# Patient Record
Sex: MALE | Race: WHITE | Employment: OTHER | URBAN - METROPOLITAN AREA
[De-identification: names, ages, dates, MRNs, and addresses within clinical notes are randomized per-mention and may not be internally consistent; named-entity substitution may affect disease eponyms.]

---

## 2022-02-14 ENCOUNTER — HOSPITAL ENCOUNTER (OUTPATIENT)
Dept: GENERAL RADIOLOGY | Age: 77
Discharge: HOME | End: 2022-02-14
Payer: MEDICARE

## 2022-02-14 DIAGNOSIS — M48.061 LUMBAR SPINAL STENOSIS: ICD-10-CM

## 2022-02-14 PROCEDURE — 72110 X-RAY EXAM L-2 SPINE 4/>VWS: CPT

## 2022-02-17 ENCOUNTER — TRANSCRIBE ORDER (OUTPATIENT)
Dept: SCHEDULING | Age: 77
End: 2022-02-17

## 2022-02-17 DIAGNOSIS — M48.061 LUMBAR SPINAL STENOSIS: Primary | ICD-10-CM

## 2022-03-01 ENCOUNTER — HOSPITAL ENCOUNTER (OUTPATIENT)
Age: 77
Discharge: HOME | End: 2022-03-01
Attending: PHYSICIAN ASSISTANT
Payer: MEDICARE

## 2022-03-01 DIAGNOSIS — M48.061 LUMBAR SPINAL STENOSIS: ICD-10-CM

## 2022-03-01 PROCEDURE — 72148 MRI LUMBAR SPINE W/O DYE: CPT

## 2022-03-09 ENCOUNTER — TRANSCRIBE ORDER (OUTPATIENT)
Dept: SCHEDULING | Age: 77
End: 2022-03-09

## 2022-03-09 DIAGNOSIS — M48.02 CERVICAL SPINAL STENOSIS: Primary | ICD-10-CM

## 2022-04-18 ENCOUNTER — HOSPITAL ENCOUNTER (OUTPATIENT)
Age: 77
Discharge: HOME | End: 2022-04-18
Attending: ORTHOPAEDIC SURGERY
Payer: COMMERCIAL

## 2022-04-18 DIAGNOSIS — M48.02 CERVICAL SPINAL STENOSIS: ICD-10-CM

## 2022-04-18 PROCEDURE — 72141 MRI NECK SPINE W/O DYE: CPT

## 2022-07-11 NOTE — PROGRESS NOTES
[facility]  [address], [address]  Phone: [phone]  Fax: [phone]        INITIAL CONSULTATION      HISTORY OF PRESENT ILLNESS:  Owen Troncoso is a 76 y.o. male whom is referred from Dr. Elba Velazquez for lumbar block evaluation. He presents today with c/o progressive low back pain radiating into the BLE (R>L) RLE radiates in a S1 distribution to the foot and LLE pain is intermittent in a similar distribution. x 60 years without injury. He reports pain has been severe over the past 6 months. He additionally c/o neck pain w/ numbness on the left side of his neck w/ right shoulder x 1-2 years w/o injury. He notes intermittent paresthesias in his left hand involving digits 1-3, admits to dropping things from that hand at times. He rates his pain 10/10. His pain is aggravated with standing and walking, alleviated with sitting. Pt is taking Neurontin 600 mg TID x 1 month and is tolerating it well with some relief. Pt reports he additionally has been treating with OTC extra strength Tylenol with some relief. Pt admits to prior spinal injections with relief 10-20 years ago. Pt admits to prior chiropractic treatment for his neck and back with benefit. Pt reports he had been in pain management years ago for 5 months. Patient denies previous spinal surgery or physical therapy. Patient denies previous cervical surgery, injections, or physical therapy. Pt denies change in bowel or bladder habits. Pt denies fever, weight loss, or skin changes. Pt is taking Plavix and Aspirin through Ramila Aguilar MD. Recently had a echocardiogram done on 5/3/2022 through Ava Smith MD which was in the normal range. Pt is a former smoker. PmHx of CAD, DM, HTN, arthritis, obesity.  The pt admits to hx of DM but does not know how his sugars run, has been advised to monitor his blood sugars by Ramila Aguilar MD. Note from Dr. Elba Velazquez dated 6/7/202 indicating patient was seen with c/o low back pain radiating into the RLE. Pain 9/10. Pt does not wish to have surgery. Recommended right sided L3 and L4 SNRBs to improve functionality. Lumbar spine MRI dated 3/1/2022 films independently reviewed. Per report, multilevel stenosis central canal stenosis moderate to severe L2-3 through L4-5. Degenerative changes L1-2 L5-S1. Herniation encroachment into the neuroforamina multiple levels. Cervical spine MRI dated 4/18/2022 films not independently reviewed. Per report, at C3-C4, disproportionate severe central canal and foraminal narrowing. Suspect slight cord edema and/or myelomalacia at the C3-C4 level. Advanced multilevel facet arthrosis most notably at C3-C4 with reactive marrow edema. Pt denies prior EMG studies. The patient is RHD.  reviewed. Body mass index is 34.22 kg/m². PCP: Demar Escalera MD    Past Medical History:   Diagnosis Date    Arthritis     Diabetes ([address]     GERD (gastroesophageal reflux disease)     Heart disease     heart attack 1997    Hypertension     Spinal stenosis    Subs  Past Surgical History:   Procedure Laterality Date    HX ANGIOPLASTY      HX CATARACT REMOVAL      both eyes   tance Use Topics    Alcohol use: Yes     Comment: daily light beer       Work status: The patient is retired. Marital status: N/A          Allergies   Allergen Reactions    Contrast Dye [Iodine] Other (comments)     Cardiac arrest          History reviewed. No pertinent family history. REVIEW OF SYSTEMS  Constitutional symptoms: Negative  Eyes: Negative  Ears, Nose, Throat, and Mouth: Negative  Cardiovascular: Negative  Respiratory: Negative  Genitourinary: Negative  Integumentary (Skin and/or breast): Negative  Musculoskeletal: Positive for low back pain radiating into the BLE  Extremities: Negative for edema.   Endocrine/Rheumatologic: Negative  Hematologic/Lymphatic: Negative  Allergic/Immunologic: Negative  Psychiatric: Negative       PHYSICAL EXAMINATION  Visit Vitals  Pulse (!) 127   Temp 97.2 °F (36.2 °C) (Skin)   Ht 5' 6\" (1.676 m)   Wt 212 lb (96.2 kg)   SpO2 98%   BMI 34.22 kg/m²       CONSTITUTIONAL: NAD, A&O x 3  HEART:  Tachycardia irregular rhythm  GASTROINTESTINAL: Positive bowel sounds, soft, nontender, and nondistended  LUNGS: Clear to auscultation bilaterally. SKIN: Negative for rash. RANGE OF MOTION: The patient has full passive range of motion in all four extremities. SENSATION: .sensation is intact to light touch throughout. MOTOR:   Straight Leg Raise: Negative, bilateral  Parker: Negative, bilateral  Tandem Gait: Neg. Deep tendon reflexes are 0 at the biceps, 0 at the brachioradialis and 1 at the triceps, bilaterally. Deep tendon reflexes are 0 at the knees and 0 at the ankles bilaterally. Ambulates with a single point cane     Shoulder AB/Flex Elbow Flex Wrist Ext Elbow Ext Wrist Flex Hand Intrin Tone   Right +4/5 +4/5 +4/5 +4/5 +4/5 +4/5 +4/5   Left +4/5 +4/5 +4/5 +4/5 +4/5 +4/5 +4/5              Hip Flex Knee Ext Knee Flex Ankle DF GTE Ankle PF Tone   Right +4/5 +4/5 +4/5 +4/5 +4/5 +4/5 +4/5   Left +4/5 +4/5 +4/5 +4/5 +4/5 +4/5 +4/5         ASSESSMENT   Diagnoses and all orders for this visit:    1. Lumbar pain    2. Lumbar neuritis    3. DDD (degenerative disc disease), lumbar    4. HNP (herniated nucleus pulposus), lumbar    5. Spinal stenosis of lumbar region without neurogenic claudication    6. Cervical spondylosis without myelopathy       IMPRESSIONS/RECOMMENDATIONS:  Patient presents today with c/o progressive low back pain radiating into the BLE (R>L) RLE radiates in a S1 distribution to the foot and LLE pain is intermittent in a similar distribution. x 60 years without injury. Pain has become severe over the past 6 months. He additionally c/o neck pain w/ numbness on the left side of his neck w/ right shoulder x 1-2 years w/o injury. He notes intermittent paresthesias in his left hand involving digits 1-3.  Multiple treatment options were discussed. I will move forward with ordering a right sided L3 and L4 SNRBs as recommended by Dr. Maximino Franz, pending approval from Edilson Hernandez MD secondary to DM and taking Aspirin and Plavix. Patient is neurologically intact. I will see the patient back prn. The pt should f/u with Dr. Maximino Franz following block. Written by Vi Quinones, as dictated by Clem Lora MD  I examined the patient, reviewed and agree with the note.

## 2022-07-13 ENCOUNTER — OFFICE VISIT (OUTPATIENT)
Dept: ORTHOPEDIC SURGERY | Age: 77
End: 2022-07-13
Payer: COMMERCIAL

## 2022-07-13 VITALS
WEIGHT: 212 LBS | TEMPERATURE: 97.2 F | HEART RATE: 127 BPM | OXYGEN SATURATION: 98 % | BODY MASS INDEX: 34.07 KG/M2 | HEIGHT: 66 IN

## 2022-07-13 DIAGNOSIS — M54.50 LUMBAR PAIN: Primary | ICD-10-CM

## 2022-07-13 DIAGNOSIS — M54.16 LUMBAR NEURITIS: ICD-10-CM

## 2022-07-13 DIAGNOSIS — M51.36 DDD (DEGENERATIVE DISC DISEASE), LUMBAR: ICD-10-CM

## 2022-07-13 DIAGNOSIS — M51.26 HNP (HERNIATED NUCLEUS PULPOSUS), LUMBAR: ICD-10-CM

## 2022-07-13 DIAGNOSIS — M48.061 SPINAL STENOSIS OF LUMBAR REGION WITHOUT NEUROGENIC CLAUDICATION: ICD-10-CM

## 2022-07-13 DIAGNOSIS — M47.812 CERVICAL SPONDYLOSIS WITHOUT MYELOPATHY: ICD-10-CM

## 2022-07-13 PROCEDURE — 1123F ACP DISCUSS/DSCN MKR DOCD: CPT | Performed by: PHYSICAL MEDICINE & REHABILITATION

## 2022-07-13 PROCEDURE — 99204 OFFICE O/P NEW MOD 45 MIN: CPT | Performed by: PHYSICAL MEDICINE & REHABILITATION

## 2022-07-13 RX ORDER — ATORVASTATIN CALCIUM 80 MG/1
TABLET, FILM COATED ORAL
COMMUNITY
Start: 2022-02-10

## 2022-07-13 RX ORDER — FAMOTIDINE 40 MG/1
40 TABLET, FILM COATED ORAL DAILY
COMMUNITY
Start: 2022-02-10

## 2022-07-13 RX ORDER — ISOSORBIDE MONONITRATE 30 MG/1
30 TABLET, EXTENDED RELEASE ORAL EVERY MORNING
COMMUNITY
Start: 2022-02-10

## 2022-07-13 RX ORDER — LISINOPRIL 20 MG/1
20 TABLET ORAL DAILY
COMMUNITY
Start: 2022-03-25 | End: 2022-08-17 | Stop reason: ALTCHOICE

## 2022-07-13 RX ORDER — GABAPENTIN 600 MG/1
600 TABLET ORAL 3 TIMES DAILY
COMMUNITY
Start: 2022-03-08

## 2022-07-13 RX ORDER — DEXTROMETHORPHAN HYDROBROMIDE, GUAIFENESIN 5; 100 MG/5ML; MG/5ML
1300 LIQUID ORAL
COMMUNITY

## 2022-07-13 RX ORDER — LANOLIN ALCOHOL/MO/W.PET/CERES
500 CREAM (GRAM) TOPICAL DAILY
COMMUNITY

## 2022-07-13 RX ORDER — METFORMIN HYDROCHLORIDE 500 MG/1
500 TABLET, EXTENDED RELEASE ORAL DAILY
COMMUNITY
Start: 2022-02-10

## 2022-07-13 RX ORDER — METOPROLOL SUCCINATE 25 MG/1
25 TABLET, EXTENDED RELEASE ORAL DAILY
COMMUNITY
Start: 2022-02-10

## 2022-07-13 RX ORDER — CHLORPHENIRAMINE MALEATE 4 MG
TABLET ORAL
COMMUNITY
Start: 2022-05-17

## 2022-07-13 RX ORDER — NITROGLYCERIN 0.4 MG/1
TABLET SUBLINGUAL
COMMUNITY
Start: 2022-02-28

## 2022-07-13 RX ORDER — CLOPIDOGREL BISULFATE 75 MG/1
75 TABLET ORAL DAILY
COMMUNITY
Start: 2022-02-10 | End: 2022-08-17 | Stop reason: ALTCHOICE

## 2022-07-13 NOTE — LETTER
7/13/2022    Patient: Tutu Aguayo   YOB: 1945   Date of Visit: 7/13/2022     Marco A Montes MD  [address] [address]  Via Fax: [address]  [address]  [address] [address] [address]  Via In [facility] [address]    Dear MD Gm Gipson MD,      Thank you for referring Mr. Tutu Aguayo to [address] for evaluation. My notes for this consultation are attached. If you have questions, please do not hesitate to call me. I look forward to following your patient along with you.       Sincerely,    Micaela Tabor MD

## 2022-07-29 ENCOUNTER — TELEPHONE (OUTPATIENT)
Dept: ORTHOPEDIC SURGERY | Age: 77
End: 2022-07-29

## 2022-08-11 ENCOUNTER — DOCUMENTATION ONLY (OUTPATIENT)
Dept: ORTHOPEDIC SURGERY | Age: 77
End: 2022-08-11

## 2022-08-17 ENCOUNTER — TELEPHONE (OUTPATIENT)
Dept: ORTHOPEDIC SURGERY | Age: 77
End: 2022-08-17

## 2022-08-26 ENCOUNTER — VIRTUAL VISIT (OUTPATIENT)
Dept: ORTHOPEDIC SURGERY | Age: 77
End: 2022-08-26
Payer: COMMERCIAL

## 2022-08-26 DIAGNOSIS — M51.26 HNP (HERNIATED NUCLEUS PULPOSUS), LUMBAR: ICD-10-CM

## 2022-08-26 DIAGNOSIS — M54.16 LUMBAR NEURITIS: ICD-10-CM

## 2022-08-26 DIAGNOSIS — M54.50 LUMBAR PAIN: Primary | ICD-10-CM

## 2022-08-26 DIAGNOSIS — M51.36 DDD (DEGENERATIVE DISC DISEASE), LUMBAR: ICD-10-CM

## 2022-08-26 DIAGNOSIS — M48.061 SPINAL STENOSIS OF LUMBAR REGION WITHOUT NEUROGENIC CLAUDICATION: ICD-10-CM

## 2022-08-26 PROCEDURE — 1123F ACP DISCUSS/DSCN MKR DOCD: CPT | Performed by: PHYSICAL MEDICINE & REHABILITATION

## 2022-08-26 PROCEDURE — 99441 PR PHYS/QHP TELEPHONE EVALUATION 5-10 MIN: CPT | Performed by: PHYSICAL MEDICINE & REHABILITATION

## 2022-08-26 RX ORDER — LORAZEPAM 1 MG/1
TABLET ORAL
COMMUNITY
Start: 2022-07-19

## 2022-08-26 RX ORDER — ASPIRIN 81 MG/1
81 TABLET ORAL DAILY
COMMUNITY
Start: 2022-07-23

## 2022-08-26 NOTE — PROGRESS NOTES
[facility]  [address], [address]   Phone: [phone]  Fax: [phone]        PROGRESS NOTE    CONSENT:  Pursuant to the emergency declaration under the [address] and [facility] waiver authority and the [employer] Act, this Virtual Visit was conducted, with patient's consent, to reduce the patient's risk of exposure to COVID-19 and provide continuity of care for an established patient. Services were unable to be provided through a video synchronous discussion virtually to substitute for in-person appointment. Subsequently, the patient was consulted through a telephone discussion. ENCOUNTER DURATION: 7 minutes 52 seconds    Ms. Phi Groves is being consulted at home by me via telephone at the [facility] office    HISTORY OF PRESENT ILLNESS:  The patient is a 76 y.o. male. Mr. Phi Groves is being consulted by me via telephone at the [facility] office for follow up of lumbar block evaluation. He presents today with c/o progressive low back pain radiating into the BLE (R>L) RLE radiates in a S1 distribution to the foot and LLE pain is intermittent in a similar distribution. x 60 years without injury. He reports pain has been severe over the past 6 months. He additionally c/o neck pain w/ numbness on the left side of his neck w/ right shoulder x 1-2 years w/o injury. He notes intermittent paresthesias in his left hand involving digits 1-3, admits to dropping things from that hand at times. His pain is aggravated with standing and walking, alleviated with sitting. Pt is taking Neurontin 600 mg TID x 1 month and is tolerating it well with some relief. Pt reports he additionally has been treating with OTC extra strength Tylenol with some relief. Pt admits to prior spinal injections with relief 10-20 years ago. Pt admits to prior chiropractic treatment for his neck and back with benefit. Pt reports he had been in pain management years ago for 5 months. Patient denies previous spinal surgery or physical therapy. Patient denies previous cervical surgery, injections, or physical therapy. Pt denies change in bowel or bladder habits. Pt denies fever, weight loss, or skin changes. Pt is taking Plavix and Aspirin through Deanna Montejo MD. Recently had a echocardiogram done on 5/3/2022 through Johnson Robles MD which was in the normal range. Pt is a former smoker. The patient is RHD. PmHx of CAD, DM, HTN, arthritis, obesity. The pt admits to hx of DM but does not know how his sugars run, has been advised to monitor his blood sugars by Deanna Montejo MD. Note from Dr. Rachel Martinez dated 6/7/202 indicating patient was seen with c/o low back pain radiating into the RLE. Pain 9/10. Pt does not wish to have surgery. Recommended right sided L3 and L4 SNRBs to improve functionality. Lumbar spine MRI dated 3/1/2022 films independently reviewed. Per report, multilevel stenosis central canal stenosis moderate to severe L2-3 through L4-5. Degenerative changes L1-2 L5-S1. Herniation encroachment into the neuroforamina multiple levels. Cervical spine MRI dated 4/18/2022 films not independently reviewed. Per report, at C3-C4, disproportionate severe central canal and foraminal narrowing. Suspect slight cord edema and/or myelomalacia at the C3-C4 level. Advanced multilevel facet arthrosis most notably at C3-C4 with reactive marrow edema. Pt denies prior EMG studies. At his last clinical appointment, his pain became severe over the past 6 months. He additionally c/o neck pain w/ numbness on the left side of his neck w/ right shoulder x 1-2 years w/o injury. He noted intermittent paresthesias in his left hand involving digits 1-3. Multiple treatment options were discussed.  I moved forward with ordering a right sided L3 and L4 SNRBs as recommended by Dr. Rachel Martinez, pending approval from Deanna Montejo MD secondary to DM and taking Aspirin and Plavix. Pt presents for an updated progress note prior to proceeding with scheduled block for 8/20/2022. Pt had been hospitalized for 4 days secondary to Afib on 7/10/2022. Discontinued Aspirin and Plavix by Cardiologist. Is taking Eliquis. At today's telephone consultation, the patient reports pain location and distribution remains unchanged. He rates his pain 9/10, previously 10/10. Denies any changes. Pt denies any signs of weakness. Pt denies change in bowel or bladder habits. The patient has a history of DM and reports blood sugars are well controlled, consistently remaining below 200.  reviewed. There is no height or weight on file to calculate BMI.     PCP: Brandi Mcghee MD      Past Medical History:   Diagnosis Date    Arthritis     Diabetes ([facility] [address]     GERD (gastroesophageal reflux disease)     Heart disease     heart attack 1997    Hypertension     Spinal stenosis         Social History     Socioeconomic History    Marital status: SINGLE     Spouse name: Not on file    Number of children: Not on file    Years of education: Not on file    Highest education level: Not on file   Occupational History    Not on file   Tobacco Use    Smoking status: Former    Smokeless tobacco: Never   Substance and Sexual Activity    Alcohol use: Yes     Comment: daily light beer    Drug use: Never    Sexual activity: Not on file   Other Topics Concern    Not on file   Social History Narrative    Not on file     Social Determinants of Health     Financial Resource Strain: Not on file   Food Insecurity: Not on file   Transportation Needs: Not on file   Physical Activity: Not on file   Stress: Not on file   Social Connections: Not on file   Intimate Partner Violence: Not on file   Housing Stability: Not on file       Current Outpatient Medications   Medication Sig Dispense Refill    LORazepam (ATIVAN) 1 mg tablet TAKE 1 TABLET BY MOUTH 1 HOUR BEFORE MRI MAY REPEAT IN ONE HOUR IF NEEDED apixaban (ELIQUIS) 5 mg tablet Take 5 mg by mouth two (2) times a day. dronedarone (MULTAQ) tab tablet Take 400 mg by mouth two (2) times daily (with meals). atorvastatin (LIPITOR) 80 mg tablet TAKE 1 TABLET BY MOUTH EVERY DAY FOR CHOLESTEROL      clotrimazole (LOTRIMIN) 1 % topical cream APPLY TO THE AFFECTED AREA EXTERNALLY TWICE DAILY FOR UP TO 14 DAYS - REPEAT FOR FLARES      cyanocobalamin (VITAMIN B12) 500 mcg tablet Take 500 mcg by mouth daily. famotidine (PEPCID) 40 mg tablet Take 40 mg by mouth daily. gabapentin (NEURONTIN) 600 mg tablet Take 600 mg by mouth three (3) times daily. isosorbide mononitrate ER (IMDUR) 30 mg tablet Take 30 mg by mouth Every morning. metFORMIN ER (GLUCOPHAGE XR) 500 mg tablet Take 500 mg by mouth daily. metoprolol succinate (TOPROL-XL) 25 mg XL tablet Take 25 mg by mouth daily. nitroglycerin (NITROSTAT) 0.4 mg SL tablet TAKE 1 TABLET SUBLINGUAL EVERY 5 MINUTES AS NEEDED FOR CHEST PAIN- IF 3 OR MORE NEEDED PROCEED TO ER      acetaminophen (TYLENOL) 650 mg TbER Take 1,300 mg by mouth two (2) times daily as needed for Pain. aspirin delayed-release 81 mg tablet Take 81 mg by mouth daily. (Patient not taking: Reported on 8/26/2022)      diazePAM (VALIUM) 10 mg tablet TAKE 1 TAB BY MOUTH AS DIRECTED BY NURSE PRIOR TO PROCEDURE (Patient not taking: Reported on 8/26/2022) 1 Tablet 0       Allergies   Allergen Reactions    Contrast Dye [Iodine] Other (comments)     Cardiac arrest          PHYSICAL EXAMINATION  Unable to perform examination secondary to COVID-19. CONSTITUTIONAL: NAD, A&O x 3    ASSESSMENT   Diagnoses and all orders for this visit:    1. Lumbar pain    2. Lumbar neuritis    3. DDD (degenerative disc disease), lumbar    4. HNP (herniated nucleus pulposus), lumbar    5.  Spinal stenosis of lumbar region without neurogenic claudication      IMPRESSION AND PLAN:  The patient consented to the tele health visit and was aware that there would be a charge. During today's telephone consultation the patient had c/o progressive low back pain radiating into the BLE (R>L) RLE radiates in a S1 distribution to the foot and LLE pain is intermittent in a similar distribution. Multiple treatment options were discussed. We will move forward with scheduled right sided L3 and L4 SNRBs. Should follow up with Dr. Vincent Brewer following spinal injections. Pt appears to be neurologically intact. I will see the patient back PRN. Written by Steve Ness, as dictated by Storm Schaumann, MD  I examined the patient, reviewed and agree with the note.

## 2022-08-30 ENCOUNTER — DOCUMENTATION ONLY (OUTPATIENT)
Dept: ORTHOPEDIC SURGERY | Age: 77
End: 2022-08-30

## 2022-08-30 NOTE — PROGRESS NOTES
Please make sure this patient follow up is with Dr. Mary Anne Perez not Dr. Yassine Lino after block is done.

## 2023-01-16 ENCOUNTER — OFFICE VISIT (OUTPATIENT)
Dept: ORTHOPEDIC SURGERY | Age: 78
End: 2023-01-16
Payer: COMMERCIAL

## 2023-01-16 VITALS
BODY MASS INDEX: 34.22 KG/M2 | OXYGEN SATURATION: 98 % | TEMPERATURE: 97.1 F | RESPIRATION RATE: 18 BRPM | HEART RATE: 74 BPM | WEIGHT: 212 LBS

## 2023-01-16 DIAGNOSIS — M51.26 HNP (HERNIATED NUCLEUS PULPOSUS), LUMBAR: ICD-10-CM

## 2023-01-16 DIAGNOSIS — M47.812 CERVICAL SPONDYLOSIS WITHOUT MYELOPATHY: ICD-10-CM

## 2023-01-16 DIAGNOSIS — M54.16 LUMBAR NEURITIS: ICD-10-CM

## 2023-01-16 DIAGNOSIS — M51.36 DDD (DEGENERATIVE DISC DISEASE), LUMBAR: ICD-10-CM

## 2023-01-16 DIAGNOSIS — M54.50 LUMBAR PAIN: Primary | ICD-10-CM

## 2023-01-16 DIAGNOSIS — M48.061 SPINAL STENOSIS OF LUMBAR REGION WITHOUT NEUROGENIC CLAUDICATION: ICD-10-CM

## 2023-01-16 PROBLEM — I47.1 ATRIAL TACHYCARDIA (HCC): Status: ACTIVE | Noted: 2022-07-20

## 2023-01-16 PROBLEM — R07.2 PRECORDIAL PAIN: Status: ACTIVE | Noted: 2022-11-01

## 2023-01-16 PROBLEM — I47.19 ATRIAL TACHYCARDIA (HCC): Status: ACTIVE | Noted: 2022-07-20

## 2023-01-16 PROBLEM — I50.9 CHRONIC CONGESTIVE HEART FAILURE (HCC): Status: ACTIVE | Noted: 2022-11-01

## 2023-01-16 PROBLEM — R06.09 DOE (DYSPNEA ON EXERTION): Status: ACTIVE | Noted: 2022-07-20

## 2023-01-16 PROCEDURE — 1123F ACP DISCUSS/DSCN MKR DOCD: CPT | Performed by: PHYSICAL MEDICINE & REHABILITATION

## 2023-01-16 PROCEDURE — 99213 OFFICE O/P EST LOW 20 MIN: CPT | Performed by: PHYSICAL MEDICINE & REHABILITATION

## 2023-01-16 RX ORDER — PANTOPRAZOLE SODIUM 40 MG/1
TABLET, DELAYED RELEASE ORAL
COMMUNITY
Start: 2022-11-07

## 2023-01-16 NOTE — LETTER
1/16/2023    Patient: Becky Woodward   YOB: 1945   Date of Visit: 1/16/2023     Kai Torrez MD  [address] [address]  Via Fax: [address]   [address]  [address] [address] [address]  Via In [place]    Dear MD Karlee Menchaca MD,      Thank you for referring Mr. Becky Woodward to [address] for evaluation. My notes for this consultation are attached. If you have questions, please do not hesitate to call me. I look forward to following your patient along with you.       Sincerely,    Jimena Amaya MD

## 2023-01-16 NOTE — PROGRESS NOTES
[facility]  [address]  [facility], [address]  Phone: [phone]  Fax: [phone]        PROGRESS NOTE      HISTORY OF PRESENT ILLNESS:  The patient is a 76 y.o. male and was seen today for follow up of  lumbar block evaluation. He presents today with c/o progressive low back pain radiating into the BLE (R>L) RLE radiates in a S1 distribution to the foot and LLE pain is intermittent in a similar distribution. x 60 years without injury. He reports pain has been severe over the past 6 months. He additionally c/o neck pain w/ numbness on the left side of his neck w/ right shoulder x 1-2 years w/o injury. He notes intermittent paresthesias in his left hand involving digits 1-3, admits to dropping things from that hand at times. His pain is aggravated with standing and walking, alleviated with sitting. Pt is taking Neurontin 600 mg TID x 1 month and is tolerating it well with some relief. Pt reports he additionally has been treating with OTC extra strength Tylenol with some relief. Pt admits to prior spinal injections with relief 10-20 years ago. Pt admits to prior chiropractic treatment for his neck and back with benefit. Pt reports he had been in pain management years ago for 5 months. Patient denies previous spinal surgery or physical therapy. Patient denies previous cervical surgery, injections, or physical therapy. Pt denies change in bowel or bladder habits. Pt denies fever, weight loss, or skin changes. Pt is taking Plavix and Aspirin through Patricia Sue MD. Recently had a echocardiogram done on 5/3/2022 through Milli Kenny MD which was in the normal range. Pt is a former smoker. The patient is RHD. PmHx of CAD, DM, HTN, arthritis, obesity.  The pt admits to hx of DM but does not know how his sugars run, has been advised to monitor his blood sugars by Patricia Sue MD. Note from Dr. Matilde Langford dated 6/7/202 indicating patient was seen with c/o low back pain radiating into the RLE. Pain 9/10. Pt does not wish to have surgery. Recommended right sided L3 and L4 SNRBs to improve functionality. Lumbar spine MRI dated 3/1/2022 films independently reviewed. Per report, multilevel stenosis central canal stenosis moderate to severe L2-3 through L4-5. Degenerative changes L1-2 L5-S1. Herniation encroachment into the neuroforamina multiple levels. Cervical spine MRI dated 4/18/2022 films not independently reviewed. Per report, at C3-C4, disproportionate severe central canal and foraminal narrowing. Suspect slight cord edema and/or myelomalacia at the C3-C4 level. Advanced multilevel facet arthrosis most notably at C3-C4 with reactive marrow edema. Pt denies prior EMG studies. At his last clinical appointment, We moved forward with scheduled right sided L3 and L4 SNRBs. Patient should follow up with Dr. Graciela Cardoza following spinal injections. The patient returns today with low back pain radiating to the RLE in a S1 distribution to the knee. Patient also notes some Distal RLE numbness He rates his pain 9-10/10, previously 9/10. Patient is still taking eliquis through Dr. Jerrica Hester ([phone]). The patient has a history of DM and reports blood sugars are well controlled, consistently remaining below 200, monitored through Dr. Petrona Burroughs, PCP. Patient takes medication for his DM, but does not recall the medicine. His pain is exacerbated by standing and walking, relieved with sitting. Pt underwent right sided on 8/30/2022 with relief for low back and RLE for a 2-3 months, 20% better and had increase in mobility. Patient denies new injury or trauma. Note from Dr. Graciela Cardoza dated 10/12/2022 indicating patient was seen with a c/o injection helped. Note from Dr. Graciela Cardoza dated 1/3/2023 indicating patient was seen with a c/o asking for L3 SNRB due to having some relief for a few months.  reviewed. Gabapentin from [facility] HR mass index is 34.22 kg/m².     PCP: Lyly Hauser, MD      Past Medical History:   Diagnosis Date    Arthritis     Diabetes ([facility] [address]     GERD (gastroesophageal reflux disease)     Heart disease     heart attack 1997    Hypertension     Spinal stenosis         Social History     Socioeconomic History    Marital status: SINGLE     Spouse name: Not on file    Number of children: Not on file    Years of education: Not on file    Highest education level: Not on file   Occupational History    Not on file   Tobacco Use    Smoking status: Former    Smokeless tobacco: Never   Substance and Sexual Activity    Alcohol use: Yes     Comment: daily light beer    Drug use: Never    Sexual activity: Not on file   Other Topics Concern    Not on file   Social History Narrative    Not on file     Social Determinants of Health     Financial Resource Strain: Not on file   Food Insecurity: Not on file   Transportation Needs: Not on file   Physical Activity: Not on file   Stress: Not on file   Social Connections: Not on file   Intimate Partner Violence: Not on file   Housing Stability: Not on file       Current Outpatient Medications   Medication Sig Dispense Refill    pantoprazole (PROTONIX) 40 mg tablet TAKE 1 TABLET BY MOUTH EVERY MORNING ON AN EMPTY STOMACH 30 MINUTES PRIOR TO BREAKFAST      LORazepam (ATIVAN) 1 mg tablet TAKE 1 TABLET BY MOUTH 1 HOUR BEFORE MRI MAY REPEAT IN ONE HOUR IF NEEDED      apixaban (ELIQUIS) 5 mg tablet Take 5 mg by mouth two (2) times a day. dronedarone (MULTAQ) tab tablet Take 400 mg by mouth two (2) times daily (with meals). atorvastatin (LIPITOR) 80 mg tablet TAKE 1 TABLET BY MOUTH EVERY DAY FOR CHOLESTEROL      clotrimazole (LOTRIMIN) 1 % topical cream APPLY TO THE AFFECTED AREA EXTERNALLY TWICE DAILY FOR UP TO 14 DAYS - REPEAT FOR FLARES      cyanocobalamin (VITAMIN B12) 500 mcg tablet Take 500 mcg by mouth daily. famotidine (PEPCID) 40 mg tablet Take 40 mg by mouth daily.       gabapentin (NEURONTIN) 600 mg tablet Take 600 mg by mouth three (3) times daily. isosorbide mononitrate ER (IMDUR) 30 mg tablet Take 30 mg by mouth Every morning. metFORMIN ER (GLUCOPHAGE XR) 500 mg tablet Take 500 mg by mouth daily. metoprolol succinate (TOPROL-XL) 25 mg XL tablet Take 25 mg by mouth daily. nitroglycerin (NITROSTAT) 0.4 mg SL tablet TAKE 1 TABLET SUBLINGUAL EVERY 5 MINUTES AS NEEDED FOR CHEST PAIN- IF 3 OR MORE NEEDED PROCEED TO ER      acetaminophen (TYLENOL) 650 mg TbER Take 1,300 mg by mouth two (2) times daily as needed for Pain. aspirin delayed-release 81 mg tablet Take 81 mg by mouth daily. (Patient not taking: Reported on 8/26/2022)      diazePAM (VALIUM) 10 mg tablet TAKE 1 TAB BY MOUTH AS DIRECTED BY NURSE PRIOR TO PROCEDURE (Patient not taking: Reported on 8/26/2022) 1 Tablet 0       Allergies   Allergen Reactions    Contrast Dye [Iodine] Other (comments)     Cardiac arrest    Iodinated Contrast Media Other (comments)          PHYSICAL EXAMINATION    Visit Vitals  Pulse 74   Temp 97.1 °F (36.2 °C)   Resp 18   Wt 212 lb (96.2 kg)   SpO2 98%   BMI 34.22 kg/m²       CONSTITUTIONAL: NAD, A&O x 3  SENSATION: Intact to light touch throughout  MOTOR:  Straight Leg Raise: Negative, bilateral    Ambulates with a Hurricane     Hip Flex Knee Ext Knee Flex Ankle DF GTE Ankle PF Tone   Right +4/5 +4/5 +4/5 +4/5 +4/5 +4/5 +4/5   Left +4/5 +4/5 +4/5 +4/5 +4/5 +4/5 +4/5       ASSESSMENT   Diagnoses and all orders for this visit:    1. Lumbar pain    2. Lumbar neuritis    3. DDD (degenerative disc disease), lumbar    4. HNP (herniated nucleus pulposus), lumbar    5. Spinal stenosis of lumbar region without neurogenic claudication    6. Cervical spondylosis without myelopathy        IMPRESSION AND PLAN:  Patient returns to the office today with c/o low back pain radiating to the RLE in a S1 distribution to the knee. Multiple treatment options were discussed.  I will order a right sided L3 and L4 SNRB, with approval from Dr. Yael Grewal ([phone]) for coming off the eliquis and Dr. Jennifer Negrete for DM. I recommend he continue taking the Gabapentin 600 mg TID. Patient wants me to take over the treatment at this time. Patient is neurologically intact. I will see the patient back block or earlier if needed. Written by Alon Kebede, as dictated by Sabrina Ward MD  I examined the patient, reviewed and agree with the note.

## 2023-01-17 ENCOUNTER — TELEPHONE (OUTPATIENT)
Dept: ORTHOPEDIC SURGERY | Age: 78
End: 2023-01-17

## 2023-01-17 NOTE — TELEPHONE ENCOUNTER
Jessica Gonzales,                                       He is to follow up with Dr Lola Gutierrez from now on because the patient is not interested in surgery.  It is in the Office note that is scanned in with the referral. He has a follow up after the block on 2/27/2023

## 2023-01-17 NOTE — TELEPHONE ENCOUNTER
Per 's request I have tried to call Dr. Alla Carson office multiple times to see if the patient is to follow up with [facility] or Christine Holloway. The phone has been giving a busy signal at Alla Carson office for the past two days. Patient stated during his visit he was told to follow up with [facility], but there was no indication of [facility] recommending this in his note that was sent over with the pts referral.I will continue to try his office.

## 2023-01-25 ENCOUNTER — TELEPHONE (OUTPATIENT)
Dept: ORTHOPEDIC SURGERY | Age: 78
End: 2023-01-25

## 2023-01-25 DIAGNOSIS — F41.9 ANXIETY: Primary | ICD-10-CM

## 2023-01-25 RX ORDER — DIAZEPAM 10 MG/1
TABLET ORAL
Qty: 1 TABLET | Refills: 0 | Status: SHIPPED | OUTPATIENT
Start: 2023-01-25

## 2023-01-26 ENCOUNTER — TELEPHONE (OUTPATIENT)
Dept: ORTHOPEDIC SURGERY | Age: 78
End: 2023-01-26

## 2023-02-27 ENCOUNTER — OFFICE VISIT (OUTPATIENT)
Age: 78
End: 2023-02-27
Payer: COMMERCIAL

## 2023-02-27 VITALS
OXYGEN SATURATION: 97 % | BODY MASS INDEX: 34.06 KG/M2 | RESPIRATION RATE: 20 BRPM | TEMPERATURE: 97.3 F | HEART RATE: 64 BPM | WEIGHT: 211 LBS

## 2023-02-27 DIAGNOSIS — M48.061 SPINAL STENOSIS OF LUMBAR REGION WITHOUT NEUROGENIC CLAUDICATION: ICD-10-CM

## 2023-02-27 DIAGNOSIS — M51.26 HNP (HERNIATED NUCLEUS PULPOSUS), LUMBAR: ICD-10-CM

## 2023-02-27 DIAGNOSIS — M51.36 DDD (DEGENERATIVE DISC DISEASE), LUMBAR: ICD-10-CM

## 2023-02-27 DIAGNOSIS — M47.812 CERVICAL SPONDYLOSIS WITHOUT MYELOPATHY: ICD-10-CM

## 2023-02-27 DIAGNOSIS — M54.16 LUMBAR NEURITIS: Primary | ICD-10-CM

## 2023-02-27 PROCEDURE — 99214 OFFICE O/P EST MOD 30 MIN: CPT | Performed by: PHYSICAL MEDICINE & REHABILITATION

## 2023-02-27 PROCEDURE — 1123F ACP DISCUSS/DSCN MKR DOCD: CPT | Performed by: PHYSICAL MEDICINE & REHABILITATION

## 2023-02-27 RX ORDER — GABAPENTIN 800 MG/1
800 TABLET ORAL 3 TIMES DAILY
Qty: 90 TABLET | Refills: 1 | Status: SHIPPED | OUTPATIENT
Start: 2023-02-27 | End: 2023-04-28

## 2023-02-27 RX ORDER — PANTOPRAZOLE SODIUM 40 MG/1
TABLET, DELAYED RELEASE ORAL
COMMUNITY
Start: 2022-11-07
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
## 2023-03-21 ENCOUNTER — TELEPHONE (OUTPATIENT)
Age: 78
End: 2023-03-21

## 2023-03-21 NOTE — TELEPHONE ENCOUNTER
Spoke to Bimal's office and patient's emg is not scheduled until 3/28 with Kan Fernandez. I called  to schedule his emg follow up will Estefany Samuel , but he did not answer.  I left a voice message for patient to call and reschedule appoinment

## 2023-04-23 DIAGNOSIS — M54.16 LUMBAR NEURITIS: ICD-10-CM

## 2023-04-23 DIAGNOSIS — M51.36 DDD (DEGENERATIVE DISC DISEASE), LUMBAR: ICD-10-CM

## 2023-04-23 DIAGNOSIS — M48.061 SPINAL STENOSIS OF LUMBAR REGION WITHOUT NEUROGENIC CLAUDICATION: ICD-10-CM

## 2023-04-23 DIAGNOSIS — M51.26 HNP (HERNIATED NUCLEUS PULPOSUS), LUMBAR: ICD-10-CM

## 2023-04-24 RX ORDER — GABAPENTIN 800 MG/1
TABLET ORAL
Qty: 90 TABLET | OUTPATIENT
Start: 2023-04-24

## 2023-04-27 DIAGNOSIS — M51.26 HNP (HERNIATED NUCLEUS PULPOSUS), LUMBAR: ICD-10-CM

## 2023-04-27 DIAGNOSIS — M54.16 LUMBAR NEURITIS: ICD-10-CM

## 2023-04-27 DIAGNOSIS — M51.36 DDD (DEGENERATIVE DISC DISEASE), LUMBAR: ICD-10-CM

## 2023-04-27 DIAGNOSIS — M48.061 SPINAL STENOSIS OF LUMBAR REGION WITHOUT NEUROGENIC CLAUDICATION: ICD-10-CM

## 2023-04-28 ENCOUNTER — OFFICE VISIT (OUTPATIENT)
Age: 78
End: 2023-04-28
Payer: COMMERCIAL

## 2023-04-28 VITALS
HEART RATE: 71 BPM | TEMPERATURE: 97.6 F | OXYGEN SATURATION: 99 % | WEIGHT: 216 LBS | BODY MASS INDEX: 34.72 KG/M2 | HEIGHT: 66 IN

## 2023-04-28 DIAGNOSIS — M54.16 LUMBAR NEURITIS: ICD-10-CM

## 2023-04-28 DIAGNOSIS — M51.26 HNP (HERNIATED NUCLEUS PULPOSUS), LUMBAR: ICD-10-CM

## 2023-04-28 DIAGNOSIS — M48.062 SPINAL STENOSIS OF LUMBAR REGION WITH NEUROGENIC CLAUDICATION: Primary | ICD-10-CM

## 2023-04-28 DIAGNOSIS — M54.16 RADICULOPATHY, LUMBAR REGION: ICD-10-CM

## 2023-04-28 DIAGNOSIS — M51.36 DDD (DEGENERATIVE DISC DISEASE), LUMBAR: ICD-10-CM

## 2023-04-28 PROCEDURE — 1123F ACP DISCUSS/DSCN MKR DOCD: CPT | Performed by: PHYSICAL MEDICINE & REHABILITATION

## 2023-04-28 PROCEDURE — 99214 OFFICE O/P EST MOD 30 MIN: CPT | Performed by: PHYSICAL MEDICINE & REHABILITATION

## 2023-04-28 RX ORDER — GABAPENTIN 800 MG/1
800 TABLET ORAL 3 TIMES DAILY
Qty: 90 TABLET | Refills: 1 | OUTPATIENT
Start: 2023-04-28 | End: 2023-06-27

## 2023-04-28 RX ORDER — GABAPENTIN 800 MG/1
800 TABLET ORAL 3 TIMES DAILY
Qty: 270 TABLET | Refills: 0 | Status: SHIPPED | OUTPATIENT
Start: 2023-04-28 | End: 2023-07-27

## 2023-04-28 RX ORDER — ALBUTEROL SULFATE 90 UG/1
AEROSOL, METERED RESPIRATORY (INHALATION)
COMMUNITY
Start: 2023-04-25

## 2023-04-28 ASSESSMENT — PATIENT HEALTH QUESTIONNAIRE - PHQ9
2. FEELING DOWN, DEPRESSED OR HOPELESS: 0
SUM OF ALL RESPONSES TO PHQ QUESTIONS 1-9: 0
1. LITTLE INTEREST OR PLEASURE IN DOING THINGS: 0
SUM OF ALL RESPONSES TO PHQ QUESTIONS 1-9: 0
SUM OF ALL RESPONSES TO PHQ9 QUESTIONS 1 & 2: 0

## 2023-04-28 NOTE — PROGRESS NOTES
[facility]  [address], [place]   Phone: [phone]  Fax: [phone]        PROGRESS NOTE      HISTORY OF PRESENT ILLNESS:  The patient is a [de-identified] y.o. male and was seen today for follow up of low back pain radiating to the BLE(R>L) in a S1 distribution to the knee. Patient also notes some distal RLE numbness. lumbar block evaluation. He presents today with c/o progressive low back pain radiating into the BLE (R>L) RLE radiates in a S1 distribution to the foot and LLE pain is intermittent in a similar distribution. x 60 years without injury. He reports pain has been severe over the past 6 months. He additionally c/o neck pain w/ numbness on the left side of his neck w/ right shoulder x 1-2 years w/o injury. He notes intermittent paresthesias in his left hand involving digits 1-3, admits to dropping things from that hand at times. His pain is aggravated with standing and walking, alleviated with sitting. Pt is taking Neurontin 600 mg TID x 1 month and is tolerating it well with some relief. Pt reports he additionally has been treating with OTC extra strength Tylenol with some relief. Pt admits to prior spinal injections with relief 10-20 years ago. Pt underwent right sided L3 and L4 SNRB on 8/30/2022 with relief for low back and RLE for a 2-3 months, 20% better and had increase his mobility. Pt underwent right sided L3 and L4 SNRB on 1/31/2023 with relief, 10% better. Pt admits to prior chiropractic treatment for his neck and back with benefit. Pt reports he had been in pain management years ago for 5 months. Patient denies previous spinal surgery or physical therapy. Patient denies previous cervical surgery, injections, or physical therapy. Pt denies change in bowel or bladder habits. Pt denies fever, weight loss, or skin changes. Recently had a echocardiogram done on 5/3/2022 through Gerardo Reza MD which was in the normal range.  Patient is still taking Eliquis

## 2023-05-19 ENCOUNTER — TELEPHONE (OUTPATIENT)
Age: 78
End: 2023-05-19

## 2023-05-19 NOTE — TELEPHONE ENCOUNTER
Called pt to finalize instructions for his injection that was scheduled for 5/23. Patient stated that he is doing pretty good right now and only has one more inj available to him this year (pt has had 2 already this year). Pt advised to keep his fu appt on 6/28 and if he needs the injection at that time we will go forward.

## 2023-06-28 ENCOUNTER — OFFICE VISIT (OUTPATIENT)
Age: 78
End: 2023-06-28
Payer: COMMERCIAL

## 2023-06-28 VITALS
BODY MASS INDEX: 33.91 KG/M2 | HEIGHT: 66 IN | HEART RATE: 63 BPM | WEIGHT: 211 LBS | RESPIRATION RATE: 14 BRPM | OXYGEN SATURATION: 96 % | TEMPERATURE: 97.9 F

## 2023-06-28 DIAGNOSIS — M54.16 RADICULOPATHY, LUMBAR REGION: ICD-10-CM

## 2023-06-28 DIAGNOSIS — M48.062 SPINAL STENOSIS OF LUMBAR REGION WITH NEUROGENIC CLAUDICATION: Primary | ICD-10-CM

## 2023-06-28 DIAGNOSIS — M51.36 DDD (DEGENERATIVE DISC DISEASE), LUMBAR: ICD-10-CM

## 2023-06-28 DIAGNOSIS — M54.16 LUMBAR NEURITIS: ICD-10-CM

## 2023-06-28 DIAGNOSIS — M51.26 HNP (HERNIATED NUCLEUS PULPOSUS), LUMBAR: ICD-10-CM

## 2023-06-28 PROCEDURE — 99214 OFFICE O/P EST MOD 30 MIN: CPT | Performed by: PHYSICAL MEDICINE & REHABILITATION

## 2023-06-28 PROCEDURE — 1123F ACP DISCUSS/DSCN MKR DOCD: CPT | Performed by: PHYSICAL MEDICINE & REHABILITATION

## 2023-06-28 RX ORDER — GABAPENTIN 300 MG/1
600 CAPSULE ORAL 2 TIMES DAILY
COMMUNITY
Start: 2023-06-17

## 2023-06-28 RX ORDER — CALCIUM CARBONATE/VITAMIN D3 600 MG-10
1 TABLET ORAL DAILY
COMMUNITY
Start: 2023-06-19

## 2023-06-28 RX ORDER — MULTIPLE VITAMINS W/ MINERALS TAB 9MG-400MCG
TAB ORAL
COMMUNITY
Start: 2023-06-19

## 2023-06-28 RX ORDER — FLUTICASONE FUROATE, UMECLIDINIUM BROMIDE AND VILANTEROL TRIFENATATE 200; 62.5; 25 UG/1; UG/1; UG/1
1 POWDER RESPIRATORY (INHALATION) DAILY
COMMUNITY
Start: 2023-06-20

## 2023-06-28 RX ORDER — FOLIC ACID 1 MG/1
1000 TABLET ORAL DAILY
COMMUNITY
Start: 2023-06-17

## 2023-06-28 RX ORDER — FUROSEMIDE 20 MG/1
TABLET ORAL
COMMUNITY
Start: 2023-06-12

## 2023-06-28 ASSESSMENT — PATIENT HEALTH QUESTIONNAIRE - PHQ9
SUM OF ALL RESPONSES TO PHQ9 QUESTIONS 1 & 2: 0
1. LITTLE INTEREST OR PLEASURE IN DOING THINGS: 0
SUM OF ALL RESPONSES TO PHQ QUESTIONS 1-9: 0
2. FEELING DOWN, DEPRESSED OR HOPELESS: 0

## 2023-11-29 ENCOUNTER — NEW PATIENT (OUTPATIENT)
Dept: RURAL CLINIC 1 | Facility: CLINIC | Age: 78
End: 2023-11-29

## 2023-11-29 DIAGNOSIS — E11.9: ICD-10-CM

## 2023-11-29 DIAGNOSIS — Z96.1: ICD-10-CM

## 2023-11-29 PROCEDURE — 99204 OFFICE O/P NEW MOD 45 MIN: CPT

## 2023-11-29 ASSESSMENT — VISUAL ACUITY
OS_PH: 20/30
OS_BAT: 20/40+2
OS_SC: 20/40+2
OD_PH: 20/50
OD_SC: 20/80
OD_BAT: 20/40+1

## 2023-11-29 ASSESSMENT — TONOMETRY
OS_IOP_MMHG: 15
OD_IOP_MMHG: 13

## 2024-10-07 ENCOUNTER — TELEPHONE (OUTPATIENT)
Age: 79
End: 2024-10-07

## 2024-10-07 ENCOUNTER — OFFICE VISIT (OUTPATIENT)
Age: 79
End: 2024-10-07
Payer: MEDICARE

## 2024-10-07 VITALS
TEMPERATURE: 98 F | BODY MASS INDEX: 36.8 KG/M2 | HEART RATE: 62 BPM | HEIGHT: 66 IN | OXYGEN SATURATION: 98 % | WEIGHT: 229 LBS

## 2024-10-07 DIAGNOSIS — M47.816 LUMBAR SPONDYLOSIS: ICD-10-CM

## 2024-10-07 DIAGNOSIS — M54.16 LUMBAR NEURITIS: ICD-10-CM

## 2024-10-07 DIAGNOSIS — M48.062 SPINAL STENOSIS OF LUMBAR REGION WITH NEUROGENIC CLAUDICATION: Primary | ICD-10-CM

## 2024-10-07 DIAGNOSIS — M51.26 HNP (HERNIATED NUCLEUS PULPOSUS), LUMBAR: ICD-10-CM

## 2024-10-07 DIAGNOSIS — M54.16 RADICULOPATHY, LUMBAR REGION: ICD-10-CM

## 2024-10-07 DIAGNOSIS — M51.362 DEGENERATION OF INTERVERTEBRAL DISC OF LUMBAR REGION WITH DISCOGENIC BACK PAIN AND LOWER EXTREMITY PAIN: ICD-10-CM

## 2024-10-07 PROCEDURE — 99214 OFFICE O/P EST MOD 30 MIN: CPT | Performed by: PHYSICAL MEDICINE & REHABILITATION

## 2024-10-07 PROCEDURE — 1123F ACP DISCUSS/DSCN MKR DOCD: CPT | Performed by: PHYSICAL MEDICINE & REHABILITATION

## 2024-10-07 PROCEDURE — 72110 X-RAY EXAM L-2 SPINE 4/>VWS: CPT | Performed by: PHYSICAL MEDICINE & REHABILITATION

## 2024-10-07 RX ORDER — FERROUS SULFATE 325(65) MG
325 TABLET ORAL
COMMUNITY
Start: 2024-08-13

## 2024-10-07 NOTE — PROGRESS NOTES
[facility]  [address]  [place] [id number]  Tel: [phone]  Fax: [phone]          PROGRESS NOTE      HISTORY OF PRESENT ILLNESS:  The patient is a 79 y.o. male and was seen today for follow up of  low back pain radiating to the BLE(R>L) in a S1 distribution to the knee. Patient also notes some distal RLE numbness. lumbar block evaluation. He presents today with c/o progressive low back pain radiating into the BLE (R>L) RLE radiates in a S1 distribution to the foot and LLE pain is intermittent in a similar distribution.x 60 years without injury. He reports pain has been severe over the past 6 months. He additionally c/o neck pain w/ numbness on the left side of his neck w/ right shoulder x 1-2 years w/o injury. He notes intermittent paresthesias in his left hand involving digits 1-3, admits to dropping things from that hand at times. His pain is aggravated with standing and walking, alleviated with sitting. Pt is taking Neurontin 600 mg TID x 1 month and is tolerating it well with some relief. Pt reports he additionally has been treating with OTC extra strength Tylenol with some relief. Pt admits to prior spinal injections with relief 10-20 years ago. Pt underwent right sided L3 and L4 SNRB on 8/30/2022 with relief for low back and RLE for a 2-3 months, 20% better and had increase his mobility. Pt underwent right sided L3 and L4 SNRB on 1/31/2023 with relief, 10% better. Pt admits to prior chiropractic treatment for his neck and back with benefit. Pt reports he had been in pain management years ago for 5 months. Patient denies previous spinal surgery or physical therapy. Patient denies previous cervical surgery, injections, or physical therapy.Pt denies change in bowel or bladder habits. Pt denies fever, weight loss, or skin changes. Recently had a echocardiogram done on 5/3/2022 through Alejandro Calvert MD which was in the normal range. Patient is still 
dronedarone hcl (MULTAQ) 400 MG TABS Take 1 tablet by mouth 2 times daily (with meals)      famotidine (PEPCID) 40 MG tablet Take 1 tablet by mouth daily      isosorbide mononitrate (IMDUR) 30 MG extended release tablet Take 1 tablet by mouth every morning      metFORMIN (GLUCOPHAGE-XR) 500 MG extended release tablet Take 1 tablet by mouth daily      metoprolol succinate (TOPROL XL) 25 MG extended release tablet Take 1 tablet by mouth daily      nitroGLYCERIN (NITROSTAT) 0.4 MG SL tablet TAKE 1 TABLET SUBLINGUAL EVERY 5 MINUTES AS NEEDED FOR CHEST PAIN- IF 3 OR MORE NEEDED PROCEED TO ER      gabapentin (NEURONTIN) 800 MG tablet Take 1 tablet by mouth 3 times daily for 90 days. Max Daily Amount: 2,400 mg (Patient not taking: Reported on 6/28/2023) 270 tablet 0    gabapentin (NEURONTIN) 800 MG tablet Take 1 tablet by mouth 3 times daily for 60 days. Max Daily Amount: 2,400 mg 90 tablet 1    gabapentin (NEURONTIN) 600 MG tablet Take 1 tablet by mouth 3 times daily. (Patient not taking: Reported on 4/28/2023)       No current facility-administered medications for this visit.       Allergies   Allergen Reactions    Iodine Other (See Comments)     Cardiac arrest          PHYSICAL EXAMINATION    Pulse 62   Temp 98 °F (36.7 °C) (Skin)   Ht 1.676 m (5' 6\")   Wt 103.9 kg (229 lb)   SpO2 98%   BMI 36.96 kg/m²       CONSTITUTIONAL: NAD, A&O x 3    Ambulates with a single point cane.    MOTOR:  Straight Leg Raise: Negative, Bilaterally     Hip Flex Knee Ext Knee Flex Ankle DF GTE Ankle PF Tone   Right +4/5 +4/5 +4/5 +4/5 +4/5 +4/5 +4/5   Left +4/5 +4/5 +4/5 +4/5 +4/5 +4/5 +4/5     SENSATION: Sensation is intact to light touch throughout.     RADIOGRAPHS  Lumbar spine plain films dated 10/7/2024. 4 views: AP, Lateral, Flexion, and Extension. revealed:  Diffuse degenerative changes  No acute pathology identified  No motion on flexion or extension films      ASSESSMENT   Ishmael was seen today for back pain.    Diagnoses and

## 2024-10-07 NOTE — TELEPHONE ENCOUNTER
Called Dr Rice at the request of Dr Rush to inquire if patient is cleared to be prescribed a medrol dosepak.    Also informed them of the faxed records release for the most recent GFR.    Marla stated she would send the message to Dr Rice.

## 2024-10-10 NOTE — TELEPHONE ENCOUNTER
Received call from Dr Rice's office. Linda confirmed the patient could be prescribed a medrol dosepak.    Attempt to call patient, left generic message to return call.

## 2024-10-11 RX ORDER — METHYLPREDNISOLONE 4 MG
TABLET, DOSE PACK ORAL
Qty: 1 KIT | Refills: 0 | Status: SHIPPED | OUTPATIENT
Start: 2024-10-11

## 2024-10-11 NOTE — TELEPHONE ENCOUNTER
Spoke to Dr. Rush in regards to this message. He gave a verbal approval to send in the Medrol Dose Pack #1 take as directed with no refills.      Called patient [phone] and verified his name and date of birth. I informed him we received the approval from his PCP and Dr. Rush has received the approval. I just want to confirm the pharmacy. He stated [facility] on [address] [facility] in [place]. I informed him the prescription will be sent shortly. I informed him to take the Medrol Dose Cristobal according to the instructions. Patient verbalized understanding and he thanked us for our time and wished us a great weekend. No further action needed at this time.

## 2024-10-15 ENCOUNTER — TELEPHONE (OUTPATIENT)
Age: 79
End: 2024-10-15

## 2024-10-15 NOTE — TELEPHONE ENCOUNTER
Patient is requesting for his MRI order be sent to [facility]    Patient tel [phone]

## 2024-10-17 DIAGNOSIS — M48.062 SPINAL STENOSIS OF LUMBAR REGION WITH NEUROGENIC CLAUDICATION: ICD-10-CM

## 2024-10-17 DIAGNOSIS — M54.16 LUMBAR NEURITIS: ICD-10-CM

## 2024-10-17 DIAGNOSIS — M51.362 DEGENERATION OF INTERVERTEBRAL DISC OF LUMBAR REGION WITH DISCOGENIC BACK PAIN AND LOWER EXTREMITY PAIN: ICD-10-CM

## 2024-10-17 DIAGNOSIS — M47.816 LUMBAR SPONDYLOSIS: ICD-10-CM

## 2024-10-25 ENCOUNTER — CLINICAL DOCUMENTATION (OUTPATIENT)
Age: 79
End: 2024-10-25

## 2024-10-25 NOTE — PROGRESS NOTES
Lumbar MRI order faxed again from [place] office to [facility], fax # [phone] per their request.  Two original orders not received.

## 2024-12-09 ENCOUNTER — OFFICE VISIT (OUTPATIENT)
Age: 79
End: 2024-12-09
Payer: MEDICARE

## 2024-12-09 VITALS
HEART RATE: 57 BPM | BODY MASS INDEX: 36.48 KG/M2 | WEIGHT: 227 LBS | OXYGEN SATURATION: 95 % | HEIGHT: 66 IN | TEMPERATURE: 98 F

## 2024-12-09 DIAGNOSIS — M48.061 SPINAL STENOSIS OF LUMBAR REGION, UNSPECIFIED WHETHER NEUROGENIC CLAUDICATION PRESENT: Primary | ICD-10-CM

## 2024-12-09 DIAGNOSIS — M51.360 DEGENERATION OF INTERVERTEBRAL DISC OF LUMBAR REGION WITH DISCOGENIC BACK PAIN: ICD-10-CM

## 2024-12-09 DIAGNOSIS — M47.819 FACET ARTHROPATHY: ICD-10-CM

## 2024-12-09 PROCEDURE — 1125F AMNT PAIN NOTED PAIN PRSNT: CPT | Performed by: PHYSICAL MEDICINE & REHABILITATION

## 2024-12-09 PROCEDURE — 1123F ACP DISCUSS/DSCN MKR DOCD: CPT | Performed by: PHYSICAL MEDICINE & REHABILITATION

## 2024-12-09 PROCEDURE — 99214 OFFICE O/P EST MOD 30 MIN: CPT | Performed by: PHYSICAL MEDICINE & REHABILITATION

## 2024-12-09 PROCEDURE — 1159F MED LIST DOCD IN RCRD: CPT | Performed by: PHYSICAL MEDICINE & REHABILITATION

## 2024-12-09 PROCEDURE — 1160F RVW MEDS BY RX/DR IN RCRD: CPT | Performed by: PHYSICAL MEDICINE & REHABILITATION

## 2024-12-09 RX ORDER — ASPIRIN 81 MG/1
1 TABLET ORAL DAILY
COMMUNITY

## 2024-12-09 NOTE — PROGRESS NOTES
Referral to Ortho Injection      IMPRESSION AND PLAN:  Patient returns to the office today with c/o centralized right sided lower back pain. Multiple treatment options were discussed. He elected to proceed with blocks. I will order a L5-S1 MYCHAL pending approval from his cardiologist  secondary to the pt temporarily d/c his use of Eliquis prior to the injection and approval from his PCP  secondary to DM . The patient is Neurologically intact. I will see the patient back after the Epidural or earlier if needed.     Written by Renetta Ashby medical scribe, as dictated by Anup Rush MD  I examined the patient, reviewed and agree with the note.

## 2025-03-05 ENCOUNTER — OFFICE VISIT (OUTPATIENT)
Age: 80
End: 2025-03-05
Payer: MEDICARE

## 2025-03-05 DIAGNOSIS — M48.061 SPINAL STENOSIS OF LUMBAR REGION, UNSPECIFIED WHETHER NEUROGENIC CLAUDICATION PRESENT: Primary | ICD-10-CM

## 2025-03-05 DIAGNOSIS — M51.360 DEGENERATION OF INTERVERTEBRAL DISC OF LUMBAR REGION WITH DISCOGENIC BACK PAIN: ICD-10-CM

## 2025-03-05 DIAGNOSIS — M47.819 FACET ARTHROPATHY: ICD-10-CM

## 2025-03-05 PROCEDURE — 1159F MED LIST DOCD IN RCRD: CPT | Performed by: PHYSICAL MEDICINE & REHABILITATION

## 2025-03-05 PROCEDURE — 1123F ACP DISCUSS/DSCN MKR DOCD: CPT | Performed by: PHYSICAL MEDICINE & REHABILITATION

## 2025-03-05 PROCEDURE — 1160F RVW MEDS BY RX/DR IN RCRD: CPT | Performed by: PHYSICAL MEDICINE & REHABILITATION

## 2025-03-05 PROCEDURE — 99213 OFFICE O/P EST LOW 20 MIN: CPT | Performed by: PHYSICAL MEDICINE & REHABILITATION

## 2025-03-05 NOTE — PROGRESS NOTES
previously 6-10/10. Patient was previously taking Gabapentin 800 mg TID. Patient had labs done on 6/17/2023 that showed a GFR of 53.7 so he was decreased to gabapentin 300 mg BID by Dr. Lao, PCP. Patient is still taking Eliquis. Pt denies change in bowel or bladder habits. Patient passed out last week due to anemia, family member is not sure why he is anemic, and CHF due to fluid overload. Patient had the Labs done in the ER and his Gabapentin was decreased. Patient is going to be sent to a hematologist for his anemia in the near future. Patient also has an upcoming appointment with a GI. I do not think he is medically stable enough for a block at this time due to the recent cardiac events. I will defer his Gabapentin 300 mg BID to his PCP secondary to his recent renal function. Patient has upcoming appointment with Dr. Calvert, Cardiologist on 7/11/2023. Patient does not have an appointment with GI until after the cardiology appointment. Patient is neurologically intact. I will see the patient back as needed. Previously seen for centralized right sided lower back pain.  He rates his pain  6-10 /10, previously 8/10 (RLE is a 8/10 and LLE is a 4/10)/10. Patient says that overall his lower back pain is the same when compared to the last office visit. The pt reports that he will be on Eliquis indefinitely, as prescribed by his Cardiologist Dr. Padilla Mosher. He has a history of DM and reports blood sugars are well controlled, consistently remaining below 200. His DM is being followed by his PCP . He reports that he has continued with his Gabapentin 600 mg BID as prescribed by his PCP. He reports that his GI issues have been resolved. He reports that he was diagnosed with borderline anemia and he gets occasional iron transfusion, he additionally reports that he takes iron pills 2 times per week. He reports a resolution of his lower extremity pain. His lower back pain is exacerbated by all positions. He has

## 2025-03-06 ENCOUNTER — COMPREHENSIVE EXAM (OUTPATIENT)
Age: 80
End: 2025-03-06

## 2025-03-06 DIAGNOSIS — Z96.1: ICD-10-CM

## 2025-03-06 DIAGNOSIS — E11.9: ICD-10-CM

## 2025-03-06 PROCEDURE — 92014 COMPRE OPH EXAM EST PT 1/>: CPT
